# Patient Record
Sex: MALE | Race: ASIAN | NOT HISPANIC OR LATINO | URBAN - METROPOLITAN AREA
[De-identification: names, ages, dates, MRNs, and addresses within clinical notes are randomized per-mention and may not be internally consistent; named-entity substitution may affect disease eponyms.]

---

## 2017-08-25 ENCOUNTER — EMERGENCY (EMERGENCY)
Facility: HOSPITAL | Age: 47
LOS: 1 days | Discharge: HOME | End: 2017-08-25
Attending: EMERGENCY MEDICINE | Admitting: EMERGENCY MEDICINE
Payer: MEDICAID

## 2017-08-25 VITALS
RESPIRATION RATE: 16 BRPM | HEART RATE: 73 BPM | SYSTOLIC BLOOD PRESSURE: 145 MMHG | TEMPERATURE: 98 F | OXYGEN SATURATION: 100 % | DIASTOLIC BLOOD PRESSURE: 95 MMHG

## 2017-08-25 DIAGNOSIS — S82.899A OTHER FRACTURE OF UNSPECIFIED LOWER LEG, INITIAL ENCOUNTER FOR CLOSED FRACTURE: Chronic | ICD-10-CM

## 2017-08-25 PROCEDURE — 29130 APPL FINGER SPLINT STATIC: CPT | Mod: F7

## 2017-08-25 PROCEDURE — 73140 X-RAY EXAM OF FINGER(S): CPT | Mod: 26,RT

## 2017-08-25 PROCEDURE — 99284 EMERGENCY DEPT VISIT MOD MDM: CPT | Mod: 25

## 2017-08-25 NOTE — ED PROVIDER NOTE - ATTENDING CONTRIBUTION TO CARE
I performed the initial face to face bedside interview with this patient regarding history of present illness, review of symptoms and past medical, social and family history.  I completed an independent physical examination.  I was the initial provider who evaluated this patient.  The history, review of symptoms and examination was documented by the scribe in my presence and I attest to the accuracy of the documentation.  I have signed out the follow up of any pending tests (i.e. labs, radiological studies) to the PA.  I have discussed the patient’s plan of care and disposition with the PA. AWILDA Moon MD

## 2017-08-25 NOTE — ED PROVIDER NOTE - MEDICAL DECISION MAKING DETAILS
47y M presents with finger injury. XR without evidence of fx, will place in splint and follow up with hand surgery.

## 2017-08-25 NOTE — ED ADULT TRIAGE NOTE - CHIEF COMPLAINT QUOTE
pt closed middle finger of right hand in car door 2 weeks ago. c.o pain and immobility to finger. finger appears deformed. pmg htn hld dm

## 2017-08-25 NOTE — ED PROCEDURE NOTE - NS_EDPROVIDERDISPOUSERTYPE_ED_A_ED
Attending Attestation (For Attendings USE Only).../I have personally evaluated and examined the patient. The Attending was available to me as a supervising provider if needed.

## 2017-08-25 NOTE — ED PROVIDER NOTE - UPPER EXTREMITY EXAM, RIGHT
swelling at the right PIP, ROM at PIP limited secondary to swelling able to flex to 45 degrees and fully extend. FROM DIP. Sensation intact. 2+ pulses.

## 2017-08-25 NOTE — ED PROVIDER NOTE - OBJECTIVE STATEMENT
47y M with PMHx of HTN, DM, and HLD presents to the ED with evaluation of finger injury. States he slammed right middle finger in door 2 weeks ago. Pt was traveling in [place] at the time and did not go to hospital until now. Pt presents with persistent pain and swelling at PIP joint. No other injury. No numbness, no tingling of finger. Able to move but has pain.

## 2017-08-25 NOTE — ED PROCEDURE NOTE - NS ED PERI VASCULAR NEG
fingers/toes warm to touch/no paresthesia/no swelling/capillary refill time < 2 seconds/no cyanosis of extremity

## 2017-08-25 NOTE — ED PROVIDER NOTE - PROGRESS NOTE DETAILS
PAPO Sher: Pt signed out to me by sreekanth SANTOS. Xray negative for fracture. Plan to place finger splint and dc with hand follow up. Finger splint placed- pt tolerated. Pt stable for discharge.

## 2019-06-07 NOTE — ED ADULT TRIAGE NOTE - PRO INTERPRETER NEED 2
Date of Service: 06/05/2019    CHIEF COMPLAINT:  1.  Allergic rhinitis.  2.  Dyspnea.  3.  Allergic conjunctivitis.    HISTORY OF PRESENT ILLNESS:  The patient is a very pleasant 54-year-old  male coming in for evaluation of the above noted concerns.  Of note, I did evaluate him in our clinic approximately 3 weeks ago, at which time we started a nasal corticosteroid as well as oral antihistamine.  Over that timeframe, he feels a little bit of facial pressure is better, but he still is getting fairly consistent symptoms.  He denies any coughing, wheezing or shortness of breath.  He denies any purulence including yellow-green coloration.  He does note that he still gets a lot of congestion, particularly in the morning when he wakes up.  Discharge is mostly clear more than colored.  He denies any nosebleeds or irritation.  With regard to his breathing, he has not had any coughing, wheezing or shortness of breath consistently, although he does get some of that congestion intranasally as mentioned above.  He does not feel that spreading down to his chest.  With regard to his eyes, he feels it is better overall when he takes Zyrtec and the Flonase.  He is only taking the Flonase once a day, however.     PAST MEDICAL HISTORY, SOCIAL HISTORY AND FAMILY HISTORY:  All reviewed and essentially unchanged from previous exam.  Please see EHR for an updated list.       ALLERGIES:  ALLERGIES:  No Known Allergies         CURRENT MEDICATIONS:  Current Outpatient Medications   Medication Sig Dispense Refill   • albuterol 108 (90 Base) MCG/ACT inhaler Inhale 2 puffs into the lungs every 4 hours as needed for Shortness of Breath or Wheezing. 1 Inhaler 2   • cetirizine (ZYRTEC ALLERGY) 10 MG tablet Take 1 tablet by mouth daily. 30 tablet 4   • fluticasone (FLONASE SENSIMIST) 27.5 MCG/SPRAY nasal spray Spray 2 sprays in each nostril daily. 10 g 4   • metFORMIN (GLUCOPHAGE) 500 MG tablet Take 1 tablet by mouth daily (with  breakfast). 90 tablet 0   • blood glucose meter Test blood sugar 1 times daily as directed. Diagnosis: New Diabetic, E11.9. Meter: Pharmacist's Choice 1 kit 0   • blood glucose test strip Test blood sugar 1 times daily as directed. Diagnosis: New Diabetic, E11.9. Meter: Pharmacist's Choice 60 each 2   • blood glucose lancets Test blood sugar 1 times daily as directed. Diagnosis: New Diabetic, E11.9. Meter: Pharmacist's Choice 60 each 2   • lisinopril (PRINIVIL,ZESTRIL) 30 MG tablet TAKE 1 TABLET BY MOUTH EVERY DAY 90 tablet 1   • metoPROLOL succinate (TOPROL-XL) 25 MG 24 hr tablet TAKE 1 TABLET BY MOUTH EVERY DAY 90 tablet 0   • rosuvastatin (CRESTOR) 40 MG tablet TAKE 1 TABLET BY MOUTH DAILY. 90 tablet 0   • omega-3 acid ethyl esters (LOVAZA) 1 g capsule TAKE 2 CAPSULES BY MOUTH TWICE A  capsule 0   • HYDROcodone-acetaminophen (NORCO)  MG per tablet Take 1 tablet by mouth every 6 hours as needed for Pain.     • pregabalin (LYRICA) 100 MG capsule Take 1 capsule by mouth.     • hydrOXYzine (ATARAX) 10 MG tablet  30 tablet 0   • mometasone (NASONEX) 50 MCG/ACT nasal spray 1 spray each nostril every night at bedtime. 1 Bottle 3   • aspirin 81 MG tablet Take 1 tablet by mouth daily.     • multivitamin (THERAGRAN) per tablet Take 1 tablet by mouth daily.       No current facility-administered medications for this visit.      Please see Epic for a complete list of medications which were updated today.     TOBACCO HISTORY:  Social History     Tobacco Use   Smoking Status Former Smoker   • Packs/day: 5.00   • Years: 1.00   • Pack years: 5.00   • Types: Cigarettes   • Last attempt to quit: 1994   • Years since quittin.8   Smokeless Tobacco Former User   • Quit date: 1983       REVIEW OF SYSTEMS:  A full 10-point review of systems is completed and essentially negative except as mentioned above.       PHYSICAL EXAMINATION:    VITAL SIGNS:  Within normal limits and available in EHR.  GENERAL:  The  patient is alert and oriented x3 and in no apparent distress.  The patient is very pleasant and appears well nourished.  HEENT:  External ear canals and tympanic membranes are intact on both sides with no bulging, retraction or scarring.  The nasal mucosa is boggy with a moderate increase in clear nasal discharge.  There is a lot of posterior pharyngeal mucus with moderate cobblestoning.  The oral mucosa is otherwise normal.  The ocular conjunctivae and sclerae are normal.  There is no frontal or maxillary sinus tenderness.  CARDIOVASCULAR:  Regular rate and rhythm, normal S1, S2.  RESPIRATORY:  Good inspiratory and expiratory airflow.  No wheezes or crackles.  There is no prolonged expiratory phase or expiratory wheeze.  ABDOMEN:  Soft, nontender, nondistended.  There is no noted hepatosplenomegaly.  SKIN:  No rashes, lesions, ulcerations noted.  EXTREMITIES:  Warm and noncyanotic.  There is no clubbing or edema.  LYMPHADENOPATHY:  There is no significant lymphadenopathy in anterior cervical triangle or supraclavicular region.      DIAGNOSTIC PROCEDURES DONE IN CLINIC:  None.      ASSESSMENT AND DIAGNOSES:  1.  Allergic rhinitis.  2.  Allergic conjunctivitis.  3.  Dyspnea.    PLAN:  1.  At this time, I would like to continue with Flonase Sensimist, but I would bump it up to 1 puff twice a day.  2.  I would continue with Zyrtec once daily.  3.  I am pushing for more of the NeilMed sinus rinse.  4.  Continue general pollen avoidance measures.  5.  I would like to add in Azelastine for any increasing symptoms as we transition toward the future.  6.  We will also discuss over-the-counter eyedrops as needed.  7.  Continue with Albuterol as needed per the asthma action plan, which he has not required consistently.  8.  I would like to see him back in our clinic about 2-3 months.    Thank you very much for this visit.      Dictated By: Sameer Luke MD  Signing Provider: Sameer Luke MD    [id number] ([id number])  DD:  06/05/2019 12:54:38 TD: 06/07/2019 01:05:59    Copy Sent To:      English

## 2025-01-28 NOTE — ED ADULT TRIAGE NOTE - NS ED NURSE BANDS TYPE
Name band;
Diabetes mellitus (HCC)     History of blood transfusion     unsure thinks he did    Hyperlipidemia     Hypertension     Substance abuse (HCC)        Past Surgical History:   Procedure Laterality Date    ABDOMEN SURGERY  2017    Badder cancer    BACK SURGERY      X3     BLADDER REMOVAL  2017    Urostomy currently in place    CARDIAC CATHETERIZATION      CARDIAC PROCEDURE N/A 12/17/2024    Left heart cath / coronary angiography w grafts performed by Neo Bermudez MD at [facility] CARDIAC CATH LAB    CARDIAC PROCEDURE N/A 12/17/2024    Percutaneous coronary intervention performed by Neo Bermudez MD at [facility] CARDIAC CATH LAB    CARDIAC PROCEDURE N/A 12/18/2024    Percutaneous coronary intervention performed by Neo Bermudez MD at [facility] CARDIAC CATH LAB    CERVICAL FUSION N/A 02/19/2020    Phase 1:  C5 and C6 corpectomy with placement of an expandable cage and anterior cervical plate C4-C7. performed by Franko Julien DO at [facility] OR    CERVICAL LAMINECTOMY N/A 06/17/2020    POSTERIOR CERVICAL WOUND WASH OUT AND CLOSURE performed by Franko Julien DO at [facility] OR    CERVICAL LAMINECTOMY N/A 06/26/2020    POSTERIOR CERVICAL WOUND DEBRIDEMENT WITH PLACEMENT OF WOUNDVAC performed by Franko Julien DO at [facility] OR    COLONOSCOPY N/A 09/10/2019    Dr MAUREEN Tee-Katerina, 5 yr recall    CORONARY ARTERY BYPASS GRAFT N/A 05/01/2019    CORONARY ARTERY BYPASS GRAFT X 3 WITH LEFT INTERNAL MAMMARY ARTERY, ENDOSCOPIC  VEIN HARVEST, WITH PERFUSION. performed by Dane Pathak MD at [facility] OR    LUMBAR SPINE SURGERY N/A 02/19/2020    Phase 2:  Posterior instrumented fusion C3-C7 using lateral mass screws and rods. performed by Franko Julien DO at [facility] OR    LYMPHADENECTOMY      lower ext    PROSTATECTOMY         Family History   Problem Relation Age of Onset    Cancer Mother     Vision Loss Mother     Breast Cancer Mother     Coronary Art Dis Father     Obesity Father     Kidney Disease Father     High Blood Pressure Father